# Patient Record
Sex: MALE | Race: BLACK OR AFRICAN AMERICAN | Employment: FULL TIME | URBAN - METROPOLITAN AREA
[De-identification: names, ages, dates, MRNs, and addresses within clinical notes are randomized per-mention and may not be internally consistent; named-entity substitution may affect disease eponyms.]

---

## 2022-03-07 ENCOUNTER — APPOINTMENT (OUTPATIENT)
Dept: GENERAL RADIOLOGY | Age: 62
End: 2022-03-07
Attending: STUDENT IN AN ORGANIZED HEALTH CARE EDUCATION/TRAINING PROGRAM
Payer: COMMERCIAL

## 2022-03-07 ENCOUNTER — APPOINTMENT (OUTPATIENT)
Dept: VASCULAR SURGERY | Age: 62
End: 2022-03-07
Attending: STUDENT IN AN ORGANIZED HEALTH CARE EDUCATION/TRAINING PROGRAM
Payer: COMMERCIAL

## 2022-03-07 ENCOUNTER — HOSPITAL ENCOUNTER (EMERGENCY)
Age: 62
Discharge: HOME | End: 2022-03-07
Attending: STUDENT IN AN ORGANIZED HEALTH CARE EDUCATION/TRAINING PROGRAM
Payer: COMMERCIAL

## 2022-03-07 VITALS
RESPIRATION RATE: 16 BRPM | SYSTOLIC BLOOD PRESSURE: 141 MMHG | HEART RATE: 62 BPM | BODY MASS INDEX: 32.88 KG/M2 | DIASTOLIC BLOOD PRESSURE: 76 MMHG | OXYGEN SATURATION: 97 % | TEMPERATURE: 98.4 F | HEIGHT: 76 IN | WEIGHT: 270 LBS

## 2022-03-07 DIAGNOSIS — I82.401 ACUTE DEEP VEIN THROMBOSIS (DVT) OF RIGHT LOWER EXTREMITY, UNSPECIFIED VEIN (HCC): Primary | ICD-10-CM

## 2022-03-07 DIAGNOSIS — S93.401A SPRAIN OF RIGHT ANKLE, UNSPECIFIED LIGAMENT, INITIAL ENCOUNTER: ICD-10-CM

## 2022-03-07 PROCEDURE — 96372 THER/PROPH/DIAG INJ SC/IM: CPT

## 2022-03-07 PROCEDURE — 93971 EXTREMITY STUDY: CPT | Performed by: INTERNAL MEDICINE

## 2022-03-07 PROCEDURE — 99284 EMERGENCY DEPT VISIT MOD MDM: CPT

## 2022-03-07 PROCEDURE — 74011250636 HC RX REV CODE- 250/636: Performed by: STUDENT IN AN ORGANIZED HEALTH CARE EDUCATION/TRAINING PROGRAM

## 2022-03-07 PROCEDURE — 73610 X-RAY EXAM OF ANKLE: CPT

## 2022-03-07 PROCEDURE — 93971 EXTREMITY STUDY: CPT

## 2022-03-07 RX ORDER — KETOROLAC TROMETHAMINE 30 MG/ML
30 INJECTION, SOLUTION INTRAMUSCULAR; INTRAVENOUS
Status: COMPLETED | OUTPATIENT
Start: 2022-03-07 | End: 2022-03-07

## 2022-03-07 RX ORDER — TRAMADOL HYDROCHLORIDE 50 MG/1
50 TABLET ORAL
Qty: 18 TABLET | Refills: 0 | Status: SHIPPED | OUTPATIENT
Start: 2022-03-07 | End: 2022-03-10

## 2022-03-07 RX ORDER — APIXABAN 5 MG (74)
KIT ORAL
Qty: 1 DOSE PACK | Refills: 0 | Status: SHIPPED | OUTPATIENT
Start: 2022-03-07

## 2022-03-07 RX ORDER — ENOXAPARIN SODIUM 150 MG/ML
1 INJECTION SUBCUTANEOUS
Status: COMPLETED | OUTPATIENT
Start: 2022-03-07 | End: 2022-03-07

## 2022-03-07 RX ADMIN — KETOROLAC TROMETHAMINE 30 MG: 30 INJECTION, SOLUTION INTRAMUSCULAR; INTRAVENOUS at 18:25

## 2022-03-07 RX ADMIN — ENOXAPARIN SODIUM 120 MG: 150 INJECTION SUBCUTANEOUS at 20:35

## 2022-03-07 NOTE — ED NOTES
Emergency Department Nursing Plan of Care       The Nursing Plan of Care is developed from the Nursing assessment and Emergency Department Attending provider initial evaluation. The plan of care may be reviewed in the ED Provider note.     The Plan of Care was developed with the following considerations:   Patient / Family readiness to learn indicated by:verbalized understanding  Persons(s) to be included in education: patient  Barriers to Learning/Limitations:No    Signed     Linda Farias RN    3/7/2022   6:10 PM

## 2022-03-07 NOTE — ED PROVIDER NOTES
EMERGENCY DEPARTMENT HISTORY AND PHYSICAL EXAM      Date: 3/7/2022  Patient Name: Tosha Lee    History of Presenting Illness     Chief Complaint   Patient presents with    Leg Pain       History Provided By: Patient    HPI: Tosha Lee, 62 y.o. male with past medical history of type 2 diabetes, hypertension, thromboembolism, presents to the ED with cc of right lower leg and ankle pain. Patient reports he was trying to run away from the dog when his right lower medial leg hit a fence, and he has had increased swelling as well as pain in this area since then. He also reports twisting his ankle during the same episode, and has had increased swelling as well as pain diffusely throughout his ankle joint since injury. Patient reports a history of blood clot in his legs. States that he is supposed be on blood thinners, however, has stopped taking them because they made him feel weird. Patient has been able to ambulate and put weight onto the right lower extremity despite pain, and was ambulatory into the ED today with a cane. He denies any F/C.    PCP: No primary care provider on file. No current facility-administered medications on file prior to encounter. No current outpatient medications on file prior to encounter. Past History     Past Medical History:  Past Medical History:   Diagnosis Date    Diabetes ([facility] [address]     Hypertension     Thromboembolus [facility])        Past Surgical History:  History reviewed. No pertinent surgical history. Family History:  History reviewed. No pertinent family history. Social History:  Social History     Tobacco Use    Smoking status: Light Tobacco Smoker    Smokeless tobacco: Never Used    Tobacco comment: cigars   Substance Use Topics    Alcohol use: Not Currently    Drug use: Never       Allergies:  No Known Allergies      Review of Systems   Review of Systems   Constitutional: Negative for chills and fever.    HENT: Negative for congestion and rhinorrhea. Eyes: Negative for visual disturbance. Respiratory: Negative for chest tightness and shortness of breath. Cardiovascular: Negative for chest pain and palpitations. Gastrointestinal: Negative for abdominal pain, diarrhea, nausea and vomiting. Genitourinary: Negative for dysuria, flank pain and hematuria. Musculoskeletal: Positive for arthralgias and joint swelling. Negative for back pain and neck pain. Skin: Negative for rash. Allergic/Immunologic: Negative for immunocompromised state. Neurological: Negative for dizziness, speech difficulty, weakness and headaches. Hematological: Negative for adenopathy. Psychiatric/Behavioral: Negative for dysphoric mood and suicidal ideas. Physical Exam   Physical Exam  Vitals and nursing note reviewed. Constitutional:       General: He is not in acute distress. Appearance: Normal appearance. He is not ill-appearing or toxic-appearing. HENT:      Head: Normocephalic and atraumatic. Nose: Nose normal.      Mouth/Throat:      Mouth: Mucous membranes are moist.   Eyes:      Extraocular Movements: Extraocular movements intact. Pupils: Pupils are equal, round, and reactive to light. Cardiovascular:      Rate and Rhythm: Normal rate and regular rhythm. Pulses: Normal pulses. Pulmonary:      Effort: Pulmonary effort is normal. No respiratory distress. Breath sounds: Normal breath sounds. No stridor. No wheezing or rhonchi. Abdominal:      General: Abdomen is flat. There is no distension. Palpations: There is no mass. Tenderness: There is no abdominal tenderness. Musculoskeletal:      Cervical back: Normal range of motion and neck supple. Right ankle: Swelling present. No deformity or ecchymosis. Tenderness present. Decreased range of motion. Normal pulse. Legs:    Skin:     General: Skin is warm and dry. Neurological:      General: No focal deficit present.       Mental Status: He is alert. Mental status is at baseline. Sensory: No sensory deficit. Motor: No weakness. Diagnostic Study Results     Labs -   No results found for this or any previous visit (from the past 24 hour(s)). Radiologic Studies -   DUPLEX LOWER EXT VENOUS RIGHT   Final Result      XR ANKLE RT MIN 3 V   Final Result   No acute fracture or dislocation demonstrated. CT Results  (Last 48 hours)    None        CXR Results  (Last 48 hours)    None          Medical Decision Making   INiecy MD am the first provider for this patient, and I am the attending of record for this patient encounter. I reviewed the vital signs, available nursing notes, past medical history, past surgical history, family history and social history. Vital Signs-Reviewed the patient's vital signs. Patient Vitals for the past 24 hrs:   Temp Pulse Resp BP SpO2   03/07/22 1758 99.1 °F (37.3 °C) 77 18 (!) 169/91 98 %     Records Reviewed: Nursing Notes and Old Medical Records    Provider Notes (Medical Decision Making):   DDx: ankle sprain, ligamentous injury, fracture, dislocation, muscle strain, hematoma, cellulitis, contusion, DVT. Will medicate patient for pain with Toradol. Will obtain x-ray of the right ankle as well as duplex study of the right lower extremity to rule out for DVT. Low suspicion for cellulitis despite appearance of medial right lower leg as no associated open wounds, and no systemic symptoms of infection. The study of the right lower extremity show acute nonocclusive thrombus present in the right great saphenous diving, right great saphenous Vein, and right popliteal vein. Results discussed with the patient as well as family at bedside. He is given first dose of Lovenox in the ED. Discharged with Rx for Eliquis starter pack. Advised to follow-up with PCP within 2 to 3 days. Reasons to return to the ED were discussed. Patient felt comfortable with plan as outlined above.   Return precautions discussed. ED Course:   Initial assessment performed. The patient's presenting problems have been discussed, and they are in agreement with the care plan formulated and outlined with them. I have encouraged them to ask questions as they arise throughout their visit. Mandeep Murrell MD      Disposition:  Discharge      DISCHARGE PLAN:  1. Discharge Medication List as of 3/7/2022  8:22 PM      START taking these medications    Details   apixaban (Eliquis DVT-PE Treat 30D Start) 5 mg (74 tabs) starter pack Take 10 mg (two 5 mg tablets) by mouth twice a day for 7 days. Followed by 5 mg (one 5 mg tablet) by mouth twice a day, Print, Disp-1 Dose Pack, R-0      traMADoL (Ultram) 50 mg tablet Take 1 Tablet by mouth every four (4) hours as needed for Pain for up to 3 days. Max Daily Amount: 300 mg., Print, Disp-18 Tablet, R-0           2. Follow-up Information     Follow up With Specialties Details Why [address]  In 1 week  Matt  [phone]    [facility] [place] EMERGENCY DEPT Emergency Medicine  If symptoms worsen Jennifer Fay        3. Return to ED if worse     Diagnosis     Clinical Impression:   1. Acute deep vein thrombosis (DVT) of right lower extremity, unspecified vein (HCC)    2. Sprain of right ankle, unspecified ligament, initial encounter        Attestations:    Mandeep Murrell MD    Please note that this dictation was completed with [employer], the computer voice recognition software. Quite often unanticipated grammatical, syntax, homophones, and other interpretive errors are inadvertently transcribed by the computer software. Please disregard these errors. Please excuse any errors that have escaped final proofreading. Thank you.

## 2022-03-07 NOTE — ED TRIAGE NOTES
Pt c/o R leg and ankle pain since Friday evening after twisting his ankle. Pt c/o numbness in the leg and foot. Reports hx of blood clots in his legs. RLE is red and swollen.

## 2022-03-07 NOTE — LETTER
[address]  [facility] EMERGENCY DEPT  [address] [facility] [address] [facility] [id number] [phone]    Work/School Note    Date: 3/7/2022    To Whom It May concern:    Randal Nova was seen and treated today in the emergency room by the following provider(s):  Attending Provider: Richi Arita MD.      Randal Nova is excused from work/school on 03/07/22 and 03/08/22. He is medically clear to return to work/school on 3/9/2022.        Sincerely,          Rupert Lobato RN

## 2022-03-08 NOTE — PROGRESS NOTES
[facility] [place] [facility]  Preliminary Report:  Venous Duplex Leg    Right leg venous duplex was performed. Popliteal Vein and Greater Saphenous Vein segment is Partially Compressible with Reduced Doppler signals, suggesting Non-Occlusive venous obstruction of the aforementioned vessel(s). Appears Acute. Preliminary report verbally given to Dr. Macario Rojas. Final report to follow.

## 2022-04-02 ENCOUNTER — APPOINTMENT (OUTPATIENT)
Dept: CT IMAGING | Age: 62
End: 2022-04-02
Attending: EMERGENCY MEDICINE
Payer: COMMERCIAL

## 2022-04-02 ENCOUNTER — HOSPITAL ENCOUNTER (EMERGENCY)
Age: 62
Discharge: HOME | End: 2022-04-02
Attending: EMERGENCY MEDICINE
Payer: COMMERCIAL

## 2022-04-02 VITALS
HEIGHT: 77 IN | DIASTOLIC BLOOD PRESSURE: 74 MMHG | SYSTOLIC BLOOD PRESSURE: 133 MMHG | WEIGHT: 270 LBS | TEMPERATURE: 97.9 F | OXYGEN SATURATION: 99 % | HEART RATE: 58 BPM | RESPIRATION RATE: 16 BRPM | BODY MASS INDEX: 31.88 KG/M2

## 2022-04-02 DIAGNOSIS — S39.012A STRAIN OF LUMBAR PARASPINAL MUSCLE, INITIAL ENCOUNTER: ICD-10-CM

## 2022-04-02 DIAGNOSIS — S76.212A STRAIN OF GROIN, LEFT, INITIAL ENCOUNTER: Primary | ICD-10-CM

## 2022-04-02 LAB
ANION GAP BLD CALC-SCNC: 11 MMOL/L (ref 10–20)
CA-I BLD-MCNC: 1.13 MMOL/L (ref 1.12–1.32)
CHLORIDE BLD-SCNC: 107 MMOL/L (ref 100–108)
CO2 BLD-SCNC: 25 MMOL/L (ref 19–24)
CREAT UR-MCNC: 1 MG/DL (ref 0.6–1.3)
GLUCOSE BLD STRIP.AUTO-MCNC: 141 MG/DL (ref 74–106)
LACTATE BLD-SCNC: 1.83 MMOL/L (ref 0.4–2)
POTASSIUM BLD-SCNC: 5.2 MMOL/L (ref 3.5–5.5)
SODIUM BLD-SCNC: 142 MMOL/L (ref 136–145)

## 2022-04-02 PROCEDURE — 74011000636 HC RX REV CODE- 636: Performed by: EMERGENCY MEDICINE

## 2022-04-02 PROCEDURE — 99285 EMERGENCY DEPT VISIT HI MDM: CPT

## 2022-04-02 PROCEDURE — 74177 CT ABD & PELVIS W/CONTRAST: CPT

## 2022-04-02 PROCEDURE — 83605 ASSAY OF LACTIC ACID: CPT

## 2022-04-02 PROCEDURE — 80047 BASIC METABLC PNL IONIZED CA: CPT

## 2022-04-02 RX ORDER — AMLODIPINE BESYLATE 5 MG/1
5 TABLET ORAL DAILY
COMMUNITY
End: 2022-04-02 | Stop reason: CLARIF

## 2022-04-02 RX ADMIN — IOPAMIDOL 100 ML: 755 INJECTION, SOLUTION INTRAVENOUS at 09:30
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
## 2022-04-02 NOTE — DISCHARGE INSTRUCTIONS
Your examination and CT imaging today are very reassuring, and there are no signs of significant injuries or bleeding. You can simply use ice packs to the affected area and take extra strength Tylenol 3-4 times a day for the next few days until the pain resolves.
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
## 2024-09-19 ENCOUNTER — HOSPITAL ENCOUNTER (OUTPATIENT)
Facility: HOSPITAL | Age: 64
Setting detail: RECURRING SERIES
Discharge: HOME | End: 2024-09-22
Payer: COMMERCIAL

## 2024-09-19 PROCEDURE — 97140 MANUAL THERAPY 1/> REGIONS: CPT | Performed by: PHYSICAL THERAPIST

## 2024-09-19 PROCEDURE — 97162 PT EVAL MOD COMPLEX 30 MIN: CPT | Performed by: PHYSICAL THERAPIST

## 2024-09-19 PROCEDURE — G0283 ELEC STIM OTHER THAN WOUND: HCPCS | Performed by: PHYSICAL THERAPIST

## 2024-09-25 ENCOUNTER — HOSPITAL ENCOUNTER (OUTPATIENT)
Facility: HOSPITAL | Age: 64
Setting detail: RECURRING SERIES
Discharge: HOME | End: 2024-09-28
Payer: COMMERCIAL

## 2024-09-25 PROCEDURE — 97140 MANUAL THERAPY 1/> REGIONS: CPT

## 2024-09-25 PROCEDURE — 97110 THERAPEUTIC EXERCISES: CPT

## 2024-10-01 ENCOUNTER — HOSPITAL ENCOUNTER (OUTPATIENT)
Facility: HOSPITAL | Age: 64
Setting detail: RECURRING SERIES
Discharge: HOME | End: 2024-10-04
Payer: COMMERCIAL

## 2024-10-01 PROCEDURE — 97140 MANUAL THERAPY 1/> REGIONS: CPT | Performed by: PHYSICAL THERAPIST

## 2024-10-01 PROCEDURE — 97110 THERAPEUTIC EXERCISES: CPT | Performed by: PHYSICAL THERAPIST

## 2024-10-01 NOTE — PROGRESS NOTES
PHYSICAL THERAPY - DAILY TREATMENT NOTE (updated 3/23)      Date: 10/1/2024          Patient Name:  Parveen Grissom :  1960   Medical   Diagnosis:  Other chronic pain [G89.29]  Other low back pain [M54.59] Treatment Diagnosis:  M54.32  SCIATICA, LEFT SIDE, M54.59  OTHER LOWER BACK PAIN, and M54.59, G89.29  CHRONIC LOWER BACK PAIN    Referral Source:  Lauren Estrella APRN -* Insurance:   Payor: [facility] / Plan: [place] [facility] [employer] / Product Type: *No Product type* /                     Patient  verified yes     Visit #   Current  / Total 3 16   Time   In / Out 2:48 pm 3:45 pm   Total Treatment Time 57   Total Timed Codes 42         SUBJECTIVE    Pain Level (0-10 scale): 4.5/10 R sided LBP    Any medication changes, allergies to medications, adverse drug reactions, diagnosis change, or new procedure performed?: [x] No    [] Yes (see summary sheet for update)  Medications: Verified on Patient Summary List    Subjective functional status/changes:     Pt reports having severe LBP that started several hours after his previous session. The pain continued over the weekend making it difficult for him to do anything except sleep. He states that the pain subsided on Monday and that he is currently feeling much better.     OBJECTIVE      Therapeutic Procedures:  Tx Min Billable or 1:1 Min (if diff from Tx Min) Procedure, Rationale, Specifics   25  89401 Therapeutic Exercise (timed):  increase ROM, strength, coordination, balance, and proprioception to improve patient's ability to progress to PLOF and address remaining functional goals. (see flow sheet as applicable)     Details if applicable:  Warm up, slantboard   7  23286 Neuromuscular Re-Education (timed):  improve balance, coordination, kinesthetic sense, posture, core stability and proprioception to improve patient's ability to develop conscious control of individual muscles and awareness of position of extremities in order to progress to PLOF and

## 2024-10-03 ENCOUNTER — HOSPITAL ENCOUNTER (OUTPATIENT)
Facility: HOSPITAL | Age: 64
Setting detail: RECURRING SERIES
Discharge: HOME | End: 2024-10-06
Payer: COMMERCIAL

## 2024-10-03 PROCEDURE — 97112 NEUROMUSCULAR REEDUCATION: CPT | Performed by: PHYSICAL THERAPIST

## 2024-10-03 PROCEDURE — 97110 THERAPEUTIC EXERCISES: CPT | Performed by: PHYSICAL THERAPIST

## 2024-10-03 NOTE — PROGRESS NOTES
Ultrasound, and (Elective Self Pay) Needle Insertion w/o Injection (1 or 2 muscles), (3+ muscles)     Yes  Continue plan of care  Re-Cert Due: NA  [x]  Upgrade activities as tolerated  []  Discharge due to:  []  Other:       RHONDA LUGO, PT       10/3/2024       2:26 PM

## 2024-10-08 ENCOUNTER — HOSPITAL ENCOUNTER (OUTPATIENT)
Facility: HOSPITAL | Age: 64
Setting detail: RECURRING SERIES
Discharge: HOME | End: 2024-10-11
Payer: COMMERCIAL

## 2024-10-08 PROCEDURE — 97140 MANUAL THERAPY 1/> REGIONS: CPT | Performed by: PHYSICAL THERAPIST

## 2024-10-08 PROCEDURE — 97110 THERAPEUTIC EXERCISES: CPT | Performed by: PHYSICAL THERAPIST

## 2024-10-08 NOTE — PROGRESS NOTES
unable, increased pain      Pt will be able to carry >/= 20 lbs without pain, like carrying groceries into the house.              [] Met [x] Not met [] Partially met Date:10/8 not carrying any weight yet      Pt will report centralization of symptoms/less radicular pain by 50%.      [x] Met [] Not met [] Partially met Date: 10/3 none this week                      PLAN  Frequency / Duration: Patient to be seen 1-2 times per week for 16 treatments.  Treatment Plan may include any combination of the followin Therapeutic Exercise, 52125 Neuromuscular Re-Education, 30485 Manual Therapy, 98731 Therapeutic Activity, 35204 Electrical Stim unattended, 03853 Gait Training, 06263 Ultrasound, and (Elective Self Pay) Needle Insertion w/o Injection (1 or 2 muscles), (3+ muscles)     Yes  Continue plan of care  Re-Cert Due: NA  [x]  Upgrade activities as tolerated  []  Discharge due to:  []  Other:       RHONDA LUGO, PT       10/8/2024       2:08 PM

## 2024-10-10 ENCOUNTER — HOSPITAL ENCOUNTER (OUTPATIENT)
Facility: HOSPITAL | Age: 64
Setting detail: RECURRING SERIES
Discharge: HOME | End: 2024-10-13
Payer: COMMERCIAL

## 2024-10-10 PROCEDURE — 97140 MANUAL THERAPY 1/> REGIONS: CPT | Performed by: PHYSICAL THERAPIST

## 2024-10-10 PROCEDURE — 97110 THERAPEUTIC EXERCISES: CPT | Performed by: PHYSICAL THERAPIST

## 2024-10-10 NOTE — PROGRESS NOTES
PHYSICAL THERAPY - DAILY TREATMENT NOTE (updated 3/23)      Date: 10/10/2024          Patient Name:  Parveen Grissom :  1960   Medical   Diagnosis:  Other chronic pain [G89.29]  Other low back pain [M54.59] Treatment Diagnosis:  M54.32  SCIATICA, LEFT SIDE, M54.59  OTHER LOWER BACK PAIN, and M54.59, G89.29  CHRONIC LOWER BACK PAIN    Referral Source:  Lauren Estrella APRN -* Insurance:   Payor: [facility] / Plan: [facility] [employer] / Product Type: *No Product type* /                     Patient  verified yes     Visit #   Current  / Total 6 16   Time   In / Out 3:15 pm 3:55 pm   Total Treatment Time 40   Total Timed Codes 40         SUBJECTIVE    Pain Level (0-10 scale): 4/10 L LBP    Any medication changes, allergies to medications, adverse drug reactions, diagnosis change, or new procedure performed?: [x] No    [] Yes (see summary sheet for update)  Medications: Verified on Patient Summary List    Subjective functional status/changes:     Pt reports L LB pain is less intense today.      OBJECTIVE      Therapeutic Procedures:  Tx Min Billable or 1:1 Min (if diff from Tx Min) Procedure, Rationale, Specifics   24  40107 Therapeutic Exercise (timed):  increase ROM, strength, coordination, balance, and proprioception to improve patient's ability to progress to PLOF and address remaining functional goals. (see flow sheet as applicable)     Details if applicable:      8  79899 Neuromuscular Re-Education (timed):  improve balance, coordination, kinesthetic sense, posture, core stability and proprioception to improve patient's ability to develop conscious control of individual muscles and awareness of position of extremities in order to progress to PLOF and address remaining functional goals. (see flow sheet as applicable)     Details if applicable:    TA activation & engagement with seated and standing exercises, STS-hip hinging and TA activation   8  35211 Manual Therapy (timed):  decrease pain,

## 2024-10-15 ENCOUNTER — HOSPITAL ENCOUNTER (OUTPATIENT)
Facility: HOSPITAL | Age: 64
Setting detail: RECURRING SERIES
Discharge: HOME | End: 2024-10-18
Payer: COMMERCIAL

## 2024-10-15 PROCEDURE — 97112 NEUROMUSCULAR REEDUCATION: CPT | Performed by: PHYSICAL THERAPIST

## 2024-10-15 PROCEDURE — 97110 THERAPEUTIC EXERCISES: CPT | Performed by: PHYSICAL THERAPIST

## 2024-10-15 NOTE — PROGRESS NOTES
PHYSICAL THERAPY - DAILY TREATMENT NOTE (updated 3/23)      Date: 10/15/2024          Patient Name:  Parveen Grissom :  1960   Medical   Diagnosis:  Other chronic pain [G89.29]  Other low back pain [M54.59] Treatment Diagnosis:  M54.32  SCIATICA, LEFT SIDE, M54.59  OTHER LOWER BACK PAIN, and M54.59, G89.29  CHRONIC LOWER BACK PAIN    Referral Source:  Lauren Estrella APRN -* Insurance:   Payor: [employer] / Plan: [facility] [employer] / Product Type: *No Product type* /                     Patient  verified yes     Visit #   Current  / Total 7 16   Time   In / Out 3:15 pm 3:58 pm   Total Treatment Time 40   Total Timed Codes 40         SUBJECTIVE    Pain Level (0-10 scale): 1/10 L LBP    Any medication changes, allergies to medications, adverse drug reactions, diagnosis change, or new procedure performed?: [x] No    [] Yes (see summary sheet for update)  Medications: Verified on Patient Summary List    Subjective functional status/changes:     Pt reports much less LBP overall and states he was able to do HEP over the weekend with minimal pain.      OBJECTIVE      Therapeutic Procedures:  Tx Min Billable or 1:1 Min (if diff from Tx Min) Procedure, Rationale, Specifics   25  52673 Therapeutic Exercise (timed):  increase ROM, strength, coordination, balance, and proprioception to improve patient's ability to progress to PLOF and address remaining functional goals. (see flow sheet as applicable)     Details if applicable:      15  50319 Neuromuscular Re-Education (timed):  improve balance, coordination, kinesthetic sense, posture, core stability and proprioception to improve patient's ability to develop conscious control of individual muscles and awareness of position of extremities in order to progress to PLOF and address remaining functional goals. (see flow sheet as applicable)     Details if applicable:    TA activation & engagement with   exercises, postural alignment with exercises       [id number]

## 2024-10-17 ENCOUNTER — HOSPITAL ENCOUNTER (OUTPATIENT)
Facility: HOSPITAL | Age: 64
Setting detail: RECURRING SERIES
Discharge: HOME | End: 2024-10-20
Payer: COMMERCIAL

## 2024-10-17 PROCEDURE — 97110 THERAPEUTIC EXERCISES: CPT | Performed by: PHYSICAL THERAPIST

## 2024-10-17 NOTE — PROGRESS NOTES
CURRENT STATUS/GOALS:             Posture:  Tall, slight forward flexed trunk, Loss of lumbar lordosis      Transfers: now able to STS without UE  Bed mobility with increased pain , but less than initial and able to roll into prone now though some pain    Gait and Functional Mobility:    6 MWT 1592 feet pain increased from 2/10 to 4/10   Improved upright posture since eval, still mild limp    TRUNK ROM:   Flexion:                                  [] N/A  []WNL  []Minimal  [x]Moderate  [] Major 45 cm from fingertips to floor  \"tight\"  Extension:                             [] N/A  []WNL  []Minimal  []Moderate  [x] Major   P!  Right Lateral Flexion:           [] N/A  []WNL  []Minimal  []Moderate  [x] Major  68 cm from fingertips to floor! P!  Left Lateral Flexion:              [] N/A  []WNL  []Minimal  []Moderate  [x] Major  64 cm from fingertips to floor! P!  Rotation to the Right:           [] N/A  []WNL  []Minimal  []Moderate  [] Major    Rotation to the Left:              [] N/A  []WNL  []Minimal  []Moderate  [] Major     Right Side Glide:                   [] N/A  []WNL  []Minimal  []Moderate  [] Major  Left Side Glide:                     [] N/A  []WNL  []Minimal  []Moderate  [] Major  Additional comments: improved all planes from eval, still limited     Specific joints: *normal values in ()  Hip                                AROM                            PROM                              MMT    R L R L R L   Flexion (120)         4 P! 4+   Extension (15)          3-  3-   Abduction (40)          3+  3+   Adduction (30)               IR (40)      pain         ER (40)                Hamstring and B rectus tightness     Knee                                         MMT    R L   Extension (0)  5 5   Flexion (145)  4  4         ANKLE      MMT 4+-5/5                                                       Objective/Functional Outcome Measure: Basic mobility  AM-PAC: Initial 49.92%    10/17 34.46%  AM-PAC score 
pain slightly overall today.   Pt continues to benefit from skilled services to improve performance with core stabilization and posture, spine flexibility and strength, increased awareness of neuromuscular control of abdominal muscle activation and pelvic positioning, and to address impairments in order to meet functional goals.     Patient will continue to benefit from skilled PT / OT services to modify and progress therapeutic interventions, analyze and address functional mobility deficits, analyze and address ROM deficits, analyze and address strength deficits, analyze and address soft tissue restrictions, analyze and cue for proper movement patterns, and analyze and modify for postural abnormalities to address functional deficits and attain remaining goals.    Progress toward goals / Updated goals:  []  See Progress Note/Recertification    Short Term Goals: To be accomplished in 8 treatments.     Pt will be Independent with HEP to assist with progression of therapy and decrease soreness.      [x] Met [] Not met [] Partially met  Date:10/1   10/8 inconsistent compliance reported  10/15     Pt will use heat/ice/muscle rubs/massage as instructed to decrease pain and inflammation.  No pain > 4/10 on VAS.      [x] Met [] Not met [] Partially met  Date:10/3 using TENS & heat/ice, pain 6/10 at worst  10/8 still > 4/10   10/17      Pt will be able to tolerate standing and sitting > 5-10 minutes and demonstrate improved postural awareness for these positions without pain > 4/10 to improve functional mobility.      [x] Met [] Not met [] Partially met  Date: 9/25 \"a little better\"   10/3 previously met, but pain>4/10 today   10/15     Pt will be able to complete bed mobility smoothly without pain > 4/10 and report using proper sleeping techniques for pain prevention and ideal posture to prevent muscle shortening contributing to pain issues.       [x] Met [] Not met [] Partially met  Date:10/8 improving, but still increased

## 2024-10-22 ENCOUNTER — HOSPITAL ENCOUNTER (OUTPATIENT)
Facility: HOSPITAL | Age: 64
Setting detail: RECURRING SERIES
Discharge: HOME | End: 2024-10-25
Payer: COMMERCIAL

## 2024-10-22 PROCEDURE — 97110 THERAPEUTIC EXERCISES: CPT | Performed by: PHYSICAL THERAPIST

## 2024-10-22 NOTE — PROGRESS NOTES
PHYSICAL THERAPY - DAILY TREATMENT NOTE (updated 3/23)      Date: 10/22/2024          Patient Name:  Parveen Grissom :  1960   Medical   Diagnosis:  Other chronic pain [G89.29]  Other low back pain [M54.59] Treatment Diagnosis:  M54.32  SCIATICA, LEFT SIDE, M54.59  OTHER LOWER BACK PAIN, and M54.59, G89.29  CHRONIC LOWER BACK PAIN    Referral Source:  Lauren Estrella APRN -* Insurance:   Payor: [employer] / Plan: [facility] [employer] / Product Type: *No Product type* /                     Patient  verified yes     Visit #   Current  / Total 9 16   Time   In / Out 2:32 pm 3:28 pm   Total Treatment Time 55   Total Timed Codes 40         SUBJECTIVE    Pain Level (0-10 scale): 4/10 B LBP    Any medication changes, allergies to medications, adverse drug reactions, diagnosis change, or new procedure performed?: [x] No    [] Yes (see summary sheet for update)  Medications: Verified on Patient Summary List    Subjective functional status/changes:     Pt reports B LBP 4/10 upon arrival, stating he is more sore today.      OBJECTIVE      Therapeutic Procedures:  Tx Min Billable or 1:1 Min (if diff from Tx Min) Procedure, Rationale, Specifics   35  20105 Therapeutic Exercise (timed):  increase ROM, strength, coordination, balance, and proprioception to improve patient's ability to progress to PLOF and address remaining functional goals. (see flow sheet as applicable)     Details if applicable:        93829 Neuromuscular Re-Education (timed):  improve balance, coordination, kinesthetic sense, posture, core stability and proprioception to improve patient's ability to develop conscious control of individual muscles and awareness of position of extremities in order to progress to PLOF and address remaining functional goals. (see flow sheet as applicable)     Details if applicable:    TA activation & engagement with   exercises, postural alignment with exercises   5    50039 Manual Therapy (timed):  decrease pain,

## 2024-10-24 ENCOUNTER — HOSPITAL ENCOUNTER (OUTPATIENT)
Facility: HOSPITAL | Age: 64
Setting detail: RECURRING SERIES
Discharge: HOME | End: 2024-10-27
Payer: COMMERCIAL

## 2024-10-24 PROCEDURE — 97110 THERAPEUTIC EXERCISES: CPT | Performed by: PHYSICAL THERAPIST

## 2024-10-24 NOTE — PROGRESS NOTES
to complete bed mobility smoothly without pain > 4/10 and report using proper sleeping techniques for pain prevention and ideal posture to prevent muscle shortening contributing to pain issues.       [x] Met [] Not met [] Partially met  Date:10/8 improving, but still increased pain and slow  10/15      Long Term Goals: To be accomplished in 16 treatments.     Pt will have improved AMPAC score by 10%.        [x] Met [] Not met [] Partially met Date: 10/8 improving   10/17       Pt will be able to sit greater than 45 minutes without pain >2-3/10 so they can attend [employer], watch a movie, etc.      [x] Met [] Not met [] Partially met Date:10/8 no increased pain with sitting long periods now, but still with pain >2-3 at times  10/17      Pt will be able to stand greater than 45 minutes without pain > 2-3/10 so they can do ADL's like wash dishes and light housework and fulfill his work duties.      [x] Met [] Not met [] Partially met Date: 10/8  increased pain with standing   10/17     Pt will be able to ambulate >1000 feet in a 6 min walk test to show safe gait with community distances without gait deviations,  increase of pain or difficulty to get groceries, complete household and work duties, etc.      [] Met [] Not met [x] Partially met Date: 10/8  933 feet, pain increased from 6/10 to 8/10   10/17 improving, 1592 feet, met for distance but pain increased to 4/10      Pt will  have trunk flexion WFL to be able to retrieve items from the ground and to be able to bend down and put on socks/shoes without difficulty or increased pain.      [] Met [] Not met [x] Partially met Date:10/8 unable, increased pain   10/17 some improvement, but still limited with increased pain     Pt will be able to carry >/= 20 lbs without pain, like carrying groceries into the house.              [] Met [x] Not met [] Partially met Date:10/8 not carrying any weight yet    10/17 not yet attempted > 15#       Pt will report centralization of

## 2024-10-29 ENCOUNTER — HOSPITAL ENCOUNTER (OUTPATIENT)
Facility: HOSPITAL | Age: 64
Setting detail: RECURRING SERIES
Discharge: HOME | End: 2024-11-01
Payer: COMMERCIAL

## 2024-10-29 PROCEDURE — 97110 THERAPEUTIC EXERCISES: CPT | Performed by: PHYSICAL THERAPIST

## 2024-10-29 NOTE — PROGRESS NOTES
PHYSICAL THERAPY - DAILY TREATMENT NOTE (updated 3/23)      Date: 10/29/2024          Patient Name:  Parveen Grissom :  1960   Medical   Diagnosis:  Other chronic pain [G89.29]  Other low back pain [M54.59] Treatment Diagnosis:  M54.32  SCIATICA, LEFT SIDE, M54.59  OTHER LOWER BACK PAIN, and M54.59, G89.29  CHRONIC LOWER BACK PAIN    Referral Source:  Lauren Estrella APRN -* Insurance:   Payor: [facility] / Plan: [facility] [employer] / Product Type: *No Product type* /                     Patient  verified yes     Visit #   Current  / Total 11 16   Time   In / Out 1:41 pm 2:43 pm   Total Treatment Time 60   Total Timed Codes 45         SUBJECTIVE    Pain Level (0-10 scale): 1-2 /10 R LBP    Any medication changes, allergies to medications, adverse drug reactions, diagnosis change, or new procedure performed?: [x] No    [] Yes (see summary sheet for update)  Medications: Verified on Patient Summary List    Subjective functional status/changes:     Pt reports R LBP 1-2/10 upon arrival, stating he is feeling better overall and was able to resume HEP over the weekend.  States pain 3/10 at worst since last here, so overall decreased intensity.    OBJECTIVE      Therapeutic Procedures:  Tx Min Billable or 1:1 Min (if diff from Tx Min) Procedure, Rationale, Specifics   40  15390 Therapeutic Exercise (timed):  increase ROM, strength, coordination, balance, and proprioception to improve patient's ability to progress to PLOF and address remaining functional goals. (see flow sheet as applicable)     Details if applicable:        79084 Neuromuscular Re-Education (timed):  improve balance, coordination, kinesthetic sense, posture, core stability and proprioception to improve patient's ability to develop conscious control of individual muscles and awareness of position of extremities in order to progress to PLOF and address remaining functional goals. (see flow sheet as applicable)     Details if applicable:    TA

## 2024-10-31 ENCOUNTER — HOSPITAL ENCOUNTER (OUTPATIENT)
Facility: HOSPITAL | Age: 64
Setting detail: RECURRING SERIES
End: 2024-10-31
Payer: COMMERCIAL